# Patient Record
Sex: FEMALE | Race: WHITE
[De-identification: names, ages, dates, MRNs, and addresses within clinical notes are randomized per-mention and may not be internally consistent; named-entity substitution may affect disease eponyms.]

---

## 2021-10-20 ENCOUNTER — HOSPITAL ENCOUNTER (OUTPATIENT)
Dept: HOSPITAL 53 - M RAD | Age: 36
End: 2021-10-20
Attending: FAMILY MEDICINE
Payer: COMMERCIAL

## 2021-10-20 DIAGNOSIS — K21.9: Primary | ICD-10-CM

## 2021-10-20 DIAGNOSIS — R10.11: ICD-10-CM

## 2021-10-20 NOTE — REP
INDICATION:

ABD PAIN W/ RUQ.



COMPARISON:

None



TECHNIQUE:

This procedure was performed by CHRIS Francois, under the direct supervision

of Dr. Cruz.  Images were reviewed with Dr. Cruz prior to dictation.



Liquid barium and gas producing crystals were given in the erect position, as well as

liquid barium in the prone oblique position in order to perform a double contrast

upper GI examination.





FINDINGS:

The  film shows no organomegaly or pathological masses.  The intestinal gas

pattern is unremarkable.



The oral and pharyngeal stages of deglutition were unremarkable.  Esophageal transport

is prompt and efficient and there is no evidence of esophagitis, stricture, or mucosal

ring.  There is no evidence of a  hiatal hernia. Gastroesophageal reflux was observed

to above the level of the basilio.



The stomach walls are normally outlined.  The rugal folds are smooth and regular.

There is no gastritis, neoplasm, or ulcerative disease.



The duodenal walls are normally outlined.  The mucosal folds are smooth and regular.

There is no duodenitis, peptic ulcer disease or neoplasm.  The visualized portion of

the proximal small bowel appears normal in course and caliber.



IMPRESSION:

Gastroesophageal reflux was observed to above the level of the basilio.  Otherwise

unremarkable upper GI examination.



0.3 minutes of fluoroscopy time was utilized for this procedure.  Some fluoroscopic

images are performed with last image hold technology.  These images require no

additional radiation.





<Electronically signed by Christina Hammer > 10/20/21 1630

<Electronically signed by Collin Cruz > 10/20/21 1840